# Patient Record
Sex: MALE | Race: WHITE | NOT HISPANIC OR LATINO | Employment: STUDENT | URBAN - METROPOLITAN AREA
[De-identification: names, ages, dates, MRNs, and addresses within clinical notes are randomized per-mention and may not be internally consistent; named-entity substitution may affect disease eponyms.]

---

## 2017-07-26 ENCOUNTER — OFFICE VISIT (OUTPATIENT)
Dept: URGENT CARE | Facility: CLINIC | Age: 27
End: 2017-07-26
Payer: COMMERCIAL

## 2017-07-26 VITALS
SYSTOLIC BLOOD PRESSURE: 151 MMHG | BODY MASS INDEX: 31.08 KG/M2 | TEMPERATURE: 98 F | RESPIRATION RATE: 18 BRPM | HEIGHT: 75 IN | WEIGHT: 250 LBS | DIASTOLIC BLOOD PRESSURE: 94 MMHG | OXYGEN SATURATION: 100 % | HEART RATE: 96 BPM

## 2017-07-26 DIAGNOSIS — R19.7 DIARRHEA IN ADULT PATIENT: Primary | ICD-10-CM

## 2017-07-26 DIAGNOSIS — R11.0 NAUSEA: ICD-10-CM

## 2017-07-26 PROCEDURE — 99203 OFFICE O/P NEW LOW 30 MIN: CPT | Mod: S$GLB,,, | Performed by: EMERGENCY MEDICINE

## 2017-07-26 RX ORDER — BUPROPION HYDROCHLORIDE 300 MG/1
300 TABLET ORAL DAILY
COMMUNITY

## 2017-07-26 RX ORDER — ONDANSETRON 4 MG/1
4 TABLET, ORALLY DISINTEGRATING ORAL EVERY 6 HOURS PRN
Qty: 12 TABLET | Refills: 2 | Status: SHIPPED | OUTPATIENT
Start: 2017-07-26

## 2017-07-26 RX ORDER — ALPRAZOLAM 0.5 MG/1
0.5 TABLET ORAL 3 TIMES DAILY
COMMUNITY

## 2017-07-26 RX ORDER — METRONIDAZOLE 500 MG/1
500 TABLET ORAL 3 TIMES DAILY
Qty: 21 TABLET | Refills: 0 | Status: SHIPPED | OUTPATIENT
Start: 2017-07-26 | End: 2017-08-02

## 2017-07-27 NOTE — PATIENT INSTRUCTIONS
Please return here or go to the Emergency Department for any concerns or worsening of condition.  If you were prescribed antibiotics, please take them to completion.  If you were prescribed a narcotic medication, do not drive or operate heavy equipment or machinery while taking these medications.  Please follow up with your primary care doctor or specialist as needed.  If you  smoke, please stop smoking.      Treating Diarrhea    Diarrhea happens when you have loose, watery, or frequent bowel movements. It is a common problem with many causes. Most cases of diarrhea clear up on their own. But certain cases may need treatment. Be sure to see your healthcare provider if your symptoms do not improve within a few days.  Getting relief  Treatment of diarrhea depends on its cause. Diarrhea caused by bacterial or parasite infection is often treated with antibiotics. Diarrhea caused by other factors, such as a stomach virus, often improves with simple home treatment. The tips below may also help relieve your symptoms.  · Drink plenty of fluids. This helps prevent too much fluid loss (dehydration). Water, clear soups, and electrolyte solutions are good choices. Avoid alcohol, coffee, tea, and milk. These can irritate your intestines and make symptoms worse.  · Suck on ice chips if drinking makes you queasy.  · Return to your normal diet slowly. You may want to eat bland foods at first, such as rice and toast. Also, you may need to avoid certain foods for a while, such as dairy products. These can make symptoms worse. Ask your healthcare provider if there are any other foods you should avoid.  · If you were prescribed antibiotics, take them as directed.  · Do not take anti-diarrhea medicines without asking your healthcare provider first.  Call your healthcare provider   Call your healthcare provider if you have any of the following:   · A fever of 100.4°F (38.0°C) or higher, or as directed by your healthcare  provider  · Severe pain  · Worsening diarrhea or diarrhea for more than 2 days  · Bloody vomit or stool  · Signs of dehydration (dizziness, dry mouth and tongue, rapid pulse, dark urine)  Date Last Reviewed: 7/1/2016 © 2000-2016 [employer]. [address] [place] [address]. All rights reserved. This information is not intended as a substitute for professional medical care. Always follow your healthcare professional's instructions.

## 2017-07-27 NOTE — PROGRESS NOTES
Subjective:       Patient ID: Dipak Boyd is a 26 y.o. male.    Chief Complaint: Diarrhea (2 weeks )    Diarrhea    This is a new problem. The current episode started 1 to 4 weeks ago. The problem occurs 2 to 4 times per day. The stool consistency is described as watery. The patient states that diarrhea does not awaken (last week one time ) him from sleep. Pertinent negatives include no abdominal pain, chills, fever or vomiting. Nothing aggravates the symptoms. He has tried nothing for the symptoms. His past medical history is significant for bowel resection. There is no history of inflammatory bowel disease, irritable bowel syndrome or short gut syndrome.     Review of Systems   Constitution: Negative for chills and fever.   Cardiovascular: Negative for chest pain.   Respiratory: Negative for shortness of breath.    Musculoskeletal: Negative for back pain.   Gastrointestinal: Positive for diarrhea (4 times a day for 2 weeks ) and nausea (2 weeks ). Negative for abdominal pain, constipation, hematochezia, melena and vomiting.   Genitourinary: Negative for dysuria.       Objective:      Physical Exam   Constitutional: He is oriented to person, place, and time. He appears well-developed and well-nourished.   HENT:   Head: Normocephalic and atraumatic.   Right Ear: External ear normal.   Left Ear: External ear normal.   Nose: Nose normal.   Mouth/Throat: Mucous membranes are normal.   Eyes: Conjunctivae and lids are normal.   Neck: Trachea normal and full passive range of motion without pain. Neck supple.   Cardiovascular: Normal rate, regular rhythm and normal heart sounds.    Pulmonary/Chest: Effort normal and breath sounds normal. No respiratory distress.   Abdominal: Soft. Normal appearance and bowel sounds are normal. He exhibits no distension, no abdominal bruit, no pulsatile midline mass and no mass. There is no tenderness.   Musculoskeletal: Normal range of motion. He exhibits no edema.   Neurological: He  is alert and oriented to person, place, and time. He has normal strength.   Skin: Skin is warm, dry and intact. He is not diaphoretic. No pallor.   Psychiatric: He has a normal mood and affect. His speech is normal and behavior is normal. Judgment and thought content normal. Cognition and memory are normal.   Nursing note and vitals reviewed.      Assessment:       1. Diarrhea in adult patient        Plan:       Dipak was seen today for diarrhea.    Diagnoses and all orders for this visit:    Diarrhea in adult patient  -     metronidazole (FLAGYL) 500 MG tablet; Take 1 tablet (500 mg total) by mouth 3 (three) times daily.  -     Ambulatory referral to Gastroenterology

## 2018-01-21 ENCOUNTER — OFFICE VISIT (OUTPATIENT)
Dept: URGENT CARE | Facility: CLINIC | Age: 28
End: 2018-01-21
Payer: COMMERCIAL

## 2018-01-21 VITALS
BODY MASS INDEX: 31.08 KG/M2 | OXYGEN SATURATION: 98 % | DIASTOLIC BLOOD PRESSURE: 89 MMHG | SYSTOLIC BLOOD PRESSURE: 135 MMHG | HEIGHT: 75 IN | TEMPERATURE: 100 F | WEIGHT: 250 LBS | RESPIRATION RATE: 18 BRPM | HEART RATE: 103 BPM

## 2018-01-21 DIAGNOSIS — J10.1 INFLUENZA B: ICD-10-CM

## 2018-01-21 DIAGNOSIS — R50.9 FEVER, UNSPECIFIED FEVER CAUSE: Primary | ICD-10-CM

## 2018-01-21 LAB
CTP QC/QA: YES
FLUAV AG NPH QL: NEGATIVE
FLUBV AG NPH QL: POSITIVE

## 2018-01-21 PROCEDURE — 99214 OFFICE O/P EST MOD 30 MIN: CPT | Mod: S$GLB,,, | Performed by: EMERGENCY MEDICINE

## 2018-01-21 PROCEDURE — 87804 INFLUENZA ASSAY W/OPTIC: CPT | Mod: 59,QW,S$GLB, | Performed by: EMERGENCY MEDICINE

## 2018-01-21 RX ORDER — CODEINE PHOSPHATE AND GUAIFENESIN 10; 100 MG/5ML; MG/5ML
10 SOLUTION ORAL EVERY 8 HOURS PRN
Qty: 150 ML | Refills: 0 | Status: SHIPPED | OUTPATIENT
Start: 2018-01-21 | End: 2018-01-26

## 2018-01-21 RX ORDER — OSELTAMIVIR PHOSPHATE 75 MG/1
75 CAPSULE ORAL 2 TIMES DAILY
Qty: 10 CAPSULE | Refills: 0 | Status: SHIPPED | OUTPATIENT
Start: 2018-01-21 | End: 2018-01-26

## 2018-01-21 NOTE — PATIENT INSTRUCTIONS
Influenza (Adult)    Influenza is also called the flu. It is a viral illness that affects the air passages of your lungs. It is different from the common cold. The flu can easily be passed from one to person to another. It may be spread through the air by coughing and sneezing. Or it can be spread by touching the sick person and then touching your own eyes, nose, or mouth.  The flu starts 1 to 3 days after you are exposed to the flu virus. It may last for 1 to 2 weeks but many people feel tired or fatigued for many weeks afterward. You usually dont need to take antibiotics unless you have a complication. This might be an ear or sinus infection or pneumonia.  Symptoms of the flu may be mild or severe. They can include extreme tiredness (wanting to stay in bed all day), chills, fevers, muscle aches, soreness with eye movement, headache, and a dry, hacking cough.  Home care  Follow these guidelines when caring for yourself at home:  · Avoid being around cigarette smoke, whether yours or other peoples.  · Acetaminophen or ibuprofen will help ease your fever, muscle aches, and headache. Dont give aspirin to anyone younger than 18 who has the flu. Aspirin can harm the liver.  · Nausea and loss of appetite are common with the flu. Eat light meals. Drink 6 to 8 glasses of liquids every day. Good choices are water, sport drinks, soft drinks without caffeine, juices, tea, and soup. Extra fluids will also help loosen secretions in your nose and lungs.  · Over-the-counter cold medicines will not make the flu go away faster. But the medicines may help with coughing, sore throat, and congestion in your nose and sinuses. Dont use a decongestant if you have high blood pressure.  · Stay home until your fever has been gone for at least 24 hours without using medicine to reduce fever.  Follow-up care  Follow up with your healthcare provider, or as advised, if you are not getting better over the next week.  If you are age 65 or  older, talk with your provider about getting a pneumococcal vaccine every 5 years. You should also get this vaccine if you have chronic asthma or COPD. All adults should get a flu vaccine every fall. Ask your provider about this.  When to seek medical advice  Call your healthcare provider right away if any of these occur:  · Cough with lots of colored mucus (sputum) or blood in your mucus  · Chest pain, shortness of breath, wheezing, or trouble breathing  · Severe headache, or face, neck, or ear pain  · New rash with fever  · Fever of 100.4°F (38°C) or higher, or as directed by your healthcare provider  · Confusion, behavior change, or seizure  · Severe weakness or dizziness  · You get a new fever or cough after getting better for a few days  Date Last Reviewed: 1/1/2017  © 6488-1100 [employer]. [address] [place] [address]. All rights reserved. This information is not intended as a substitute for professional medical care. Always follow your healthcare professional's instructions.        FLU B POSITIVE  REST AND HYDRATE WITH PLENTY OF FLUIDS  TYLENOL 650 MG EVERY 4-6 HOURS FOR FEVER/ACHES  MOTRIN 600 MG EVERY 6-8 HOURS FOR FEVER/ACHES  TAMIFLU RX  CHERATUSSIN AC  OVER THE COUNTER ZYRTEC D OR CLARITIN D OR ALLEGRA D FOR CONGESTION AND RUNNY NOSE  OVER THE COUNTER FLONASE NASAL SPRAY FOR SINUS CONGESTION  OVER THE COUNTER MUCINEX DM TWICE DAILY FOR COUGH    RETURN FOR ANY CONCERNS OR PROBLEMS  SEE FLU SHEET

## 2018-01-21 NOTE — PROGRESS NOTES
Subjective:       Patient ID: Dipak Boyd is a 27 y.o. male.    Vitals:    01/21/18 1636   BP: 135/89   Pulse: 103   Resp: 18   Temp: 99.8 °F (37.7 °C)       Chief Complaint: Fever (99.1 today ) and Generalized Body Aches    SEVERE COUGH KEPT HIM UP ALL NIGHT LAST NIGHT AND NOT RELIEVED WITH MUCINEX. JUST GOT HOME FROM [place]      Fever    This is a new problem. The current episode started in the past 7 days (3 days ). The problem occurs constantly. The problem has been gradually worsening. Maximum temperature: today 99.1  Associated symptoms include coughing, headaches and muscle aches. Pertinent negatives include no abdominal pain, chest pain, congestion, ear pain, nausea, sore throat or wheezing. He has tried nothing for the symptoms.   Risk factors comment:  Unknown    Review of Systems   Constitution: Positive for chills, fever, weakness and malaise/fatigue.   HENT: Negative for congestion, ear pain, hoarse voice and sore throat.    Eyes: Negative for discharge and redness.   Cardiovascular: Negative for chest pain, dyspnea on exertion and leg swelling.   Respiratory: Positive for cough. Negative for shortness of breath, sputum production and wheezing.    Musculoskeletal: Negative for myalgias.   Gastrointestinal: Negative for abdominal pain and nausea.   Neurological: Positive for headaches.       Objective:      Physical Exam   Constitutional: He is oriented to person, place, and time. He appears well-developed and well-nourished. He is cooperative.  Non-toxic appearance. He does not appear ill. No distress.   HENT:   Head: Normocephalic and atraumatic.   Right Ear: Hearing, tympanic membrane, external ear and ear canal normal.   Left Ear: Hearing, tympanic membrane, external ear and ear canal normal.   Nose: No mucosal edema, rhinorrhea or nasal deformity. No epistaxis. Right sinus exhibits no maxillary sinus tenderness and no frontal sinus tenderness. Left sinus exhibits no maxillary sinus  tenderness and no frontal sinus tenderness.   Mouth/Throat: Uvula is midline, oropharynx is clear and moist and mucous membranes are normal. No trismus in the jaw. Normal dentition. No uvula swelling. No posterior oropharyngeal erythema.   NASAL CONGESTION   Eyes: Conjunctivae and lids are normal. No scleral icterus.   Sclera clear bilat   Neck: Trachea normal, full passive range of motion without pain and phonation normal. Neck supple.   Cardiovascular: Normal rate, regular rhythm, normal heart sounds, intact distal pulses and normal pulses.    Pulmonary/Chest: Effort normal and breath sounds normal. No respiratory distress.   INTERMITTENT COUGHING   Abdominal: Soft. Normal appearance and bowel sounds are normal. There is no tenderness.   Musculoskeletal: Normal range of motion. He exhibits no edema or deformity.   Neurological: He is alert and oriented to person, place, and time. He exhibits normal muscle tone.   Skin: Skin is warm, dry and intact. He is not diaphoretic. No pallor.   Psychiatric: He has a normal mood and affect. His speech is normal and behavior is normal. Cognition and memory are normal.   Nursing note and vitals reviewed.        Office Visit on 01/21/2018   Component Date Value Ref Range Status    Rapid Influenza A Ag 01/21/2018 Negative  Negative Final    Rapid Influenza B Ag 01/21/2018 Positive* Negative Final     Acceptable 01/21/2018 Yes   Final       Assessment:       1. Fever, unspecified fever cause    2. Influenza B        Plan:       Dipak was seen today for fever and generalized body aches.    Diagnoses and all orders for this visit:    Fever, unspecified fever cause  -     POCT Influenza A/B    Influenza B    Other orders  -     oseltamivir (TAMIFLU) 75 MG capsule; Take 1 capsule (75 mg total) by mouth 2 (two) times daily.  -     guaifenesin-codeine 100-10 mg/5 ml (TUSSI-ORGANIDIN NR)  mg/5 mL syrup; Take 10 mLs by mouth every 8 (eight) hours as needed for  Cough (FOR SEVERE COUG WHEN NOT DRIVING OR AT NIGHT).      Patient Instructions     Influenza (Adult)    Influenza is also called the flu. It is a viral illness that affects the air passages of your lungs. It is different from the common cold. The flu can easily be passed from one to person to another. It may be spread through the air by coughing and sneezing. Or it can be spread by touching the sick person and then touching your own eyes, nose, or mouth.  The flu starts 1 to 3 days after you are exposed to the flu virus. It may last for 1 to 2 weeks but many people feel tired or fatigued for many weeks afterward. You usually dont need to take antibiotics unless you have a complication. This might be an ear or sinus infection or pneumonia.  Symptoms of the flu may be mild or severe. They can include extreme tiredness (wanting to stay in bed all day), chills, fevers, muscle aches, soreness with eye movement, headache, and a dry, hacking cough.  Home care  Follow these guidelines when caring for yourself at home:  · Avoid being around cigarette smoke, whether yours or other peoples.  · Acetaminophen or ibuprofen will help ease your fever, muscle aches, and headache. Dont give aspirin to anyone younger than 18 who has the flu. Aspirin can harm the liver.  · Nausea and loss of appetite are common with the flu. Eat light meals. Drink 6 to 8 glasses of liquids every day. Good choices are water, sport drinks, soft drinks without caffeine, juices, tea, and soup. Extra fluids will also help loosen secretions in your nose and lungs.  · Over-the-counter cold medicines will not make the flu go away faster. But the medicines may help with coughing, sore throat, and congestion in your nose and sinuses. Dont use a decongestant if you have high blood pressure.  · Stay home until your fever has been gone for at least 24 hours without using medicine to reduce fever.  Follow-up care  Follow up with your healthcare provider, or as  advised, if you are not getting better over the next week.  If you are age 65 or older, talk with your provider about getting a pneumococcal vaccine every 5 years. You should also get this vaccine if you have chronic asthma or COPD. All adults should get a flu vaccine every fall. Ask your provider about this.  When to seek medical advice  Call your healthcare provider right away if any of these occur:  · Cough with lots of colored mucus (sputum) or blood in your mucus  · Chest pain, shortness of breath, wheezing, or trouble breathing  · Severe headache, or face, neck, or ear pain  · New rash with fever  · Fever of 100.4°F (38°C) or higher, or as directed by your healthcare provider  · Confusion, behavior change, or seizure  · Severe weakness or dizziness  · You get a new fever or cough after getting better for a few days  Date Last Reviewed: 1/1/2017  © 7860-3578 [employer]. [address] [place] [address]. All rights reserved. This information is not intended as a substitute for professional medical care. Always follow your healthcare professional's instructions.        FLU B POSITIVE  REST AND HYDRATE WITH PLENTY OF FLUIDS  TYLENOL 650 MG EVERY 4-6 HOURS FOR FEVER/ACHES  MOTRIN 600 MG EVERY 6-8 HOURS FOR FEVER/ACHES  TAMIFLU RX  CHERATUSSIN AC  OVER THE COUNTER ZYRTEC D OR CLARITIN D OR ALLEGRA D FOR CONGESTION AND RUNNY NOSE  OVER THE COUNTER FLONASE NASAL SPRAY FOR SINUS CONGESTION  OVER THE COUNTER MUCINEX DM TWICE DAILY FOR COUGH    RETURN FOR ANY CONCERNS OR PROBLEMS  SEE FLU SHEET

## 2024-04-26 DIAGNOSIS — R79.89 ELEVATED FERRITIN: ICD-10-CM

## 2024-04-26 DIAGNOSIS — D64.9 ANEMIA: ICD-10-CM

## 2024-04-26 DIAGNOSIS — Z85.6 HISTORY OF ACUTE LYMPHOBLASTIC LEUKEMIA (ALL): Primary | ICD-10-CM

## 2024-04-29 NOTE — PROGRESS NOTES
Subjective:       Patient ID: Dipak Boyd is a 33 y.o. male.    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  Workup revealed an acute biphenotypic leukemia with complex cytogenetics.  He underwent induction treatment at [facility] with rituximab, Fludarabine and Sirena-C + Decadron.  He received a bone marrow transplant from a matched unrelated donor 9/14/10 following a conditioning regimen of Fludarabine and Busulfan.  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  Bone marrow exam 1/23/12 showed a 40-50% cellular marrow with trilineage maturation and no evidence of recurrent leukemia.  He was last seen for follow-up in my office 3/27/14 and did not return for additional appointments.    Wellness testing by his PCP 2/24 showed elevated LFTs.  Additional workup showed an elevated serum ferritin level >1000 ng/mL.  Serum iron was 90, TIBC 232 and saturation 39%.  His LFTs improved on follow-up testing.  Testing for hepatitis-B and C was negative.  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  He has also been noted to have mild anemia and mild thrombocytopenia.    Interval History  He presents to the office today by himself for a hematology evaluation.  He feels well.  He denies any recent illnesses or infections.  He has an excellent energy level and performance status.  He denies any abnormal bruising or bleeding.  No fever, chills, night sweats or weight loss.    Review of  "Systems   Constitutional:  Negative for appetite change, fatigue and fever.   HENT:  Negative for mouth sores, nosebleeds, sore throat and trouble swallowing.    Eyes: Negative.    Respiratory:  Negative for cough and shortness of breath.    Cardiovascular:  Negative for chest pain, palpitations and leg swelling.   Gastrointestinal:  Negative for abdominal distention, abdominal pain, constipation, diarrhea, nausea and vomiting.   Genitourinary:  Negative for dysuria, frequency and urgency.   Musculoskeletal:  Negative for arthralgias and back pain.   Integumentary:  Negative for pallor and rash.   Neurological:  Negative for dizziness, weakness, numbness and headaches.   Hematological:  Negative for adenopathy. Does not bruise/bleed easily.   Psychiatric/Behavioral: Negative.         PMHx:  SVT s/p ablation 2004, chronic anxiety, AML, shingles  PSHx:  Varicocele repair, strabismus surgery, bone marrow  SH:  Lifetime nonsmoker, occasional social alcohol.  Lives in [place] with his wife and 2 children, works for [employer].  FH:  PGF had leukemia and a maternal uncle had colon cancer.    Objective:        /74   Pulse 102   Temp 98.2 °F (36.8 °C)   Resp 14   Ht 6' 3.5" (1.918 m)   Wt 104.3 kg (230 lb)   SpO2 99%   BMI 28.37 kg/m²    Physical Exam  Constitutional:       Comments: Well-developed, healthy-appearing white male in NAD   HENT:      Head: Normocephalic.      Mouth/Throat:      Mouth: Mucous membranes are moist.      Pharynx: Oropharynx is clear. No posterior oropharyngeal erythema.   Eyes:      General: No scleral icterus.     Extraocular Movements: Extraocular movements intact.      Pupils: Pupils are equal, round, and reactive to light.   Cardiovascular:      Rate and Rhythm: Normal rate and regular rhythm.      Heart sounds: No murmur heard.  Pulmonary:      Comments: Lungs clear to auscultation  Abdominal:      General: Bowel sounds are normal. There is no distension.      " Palpations: Abdomen is soft.      Tenderness: There is no abdominal tenderness.      Comments: No palpable masses or organomegaly   Musculoskeletal:         General: No swelling or tenderness. Normal range of motion.      Cervical back: Neck supple. No tenderness.   Lymphadenopathy:      Comments: No palpable peripheral lymphadenopathy   Skin:     General: Skin is warm and dry.      Findings: No rash.   Neurological:      General: No focal deficit present.      Mental Status: He is alert and oriented to person, place, and time.      Cranial Nerves: No cranial nerve deficit.      Motor: No weakness.       ECOG SCORE    0 - Fully active-able to carry on all pre-disease performance without restriction          LABORATORY  Recent Results (from the past 168 hour(s))   Reticulocytes    Collection Time: 05/06/24  1:36 PM   Result Value Ref Range    Retic Cnt Auto 1.56 1.1 - 2.1 %    RET# 0.0660 0.026 - 0.095 x10e6/uL   Iron and TIBC    Collection Time: 05/06/24  1:36 PM   Result Value Ref Range    Iron Binding Capacity Unsaturated 104 69 - 240 ug/dL    Iron Level 112 65 - 175 ug/dL    Transferrin 197 174 - 364 mg/dL    Iron Binding Capacity Total 216 (L) 250 - 450 ug/dL    Iron Saturation 52 (H) 20 - 50 %   CBC with Differential    Collection Time: 05/06/24  1:36 PM   Result Value Ref Range    WBC 6.89 4.50 - 11.50 x10(3)/mcL    RBC 4.18 (L) 4.70 - 6.10 x10(6)/mcL    Hgb 13.6 (L) 14.0 - 18.0 g/dL    Hct 40.9 (L) 42.0 - 52.0 %    MCV 97.8 (H) 80.0 - 94.0 fL    MCH 32.5 (H) 27.0 - 31.0 pg    MCHC 33.3 33.0 - 36.0 g/dL    RDW 13.0 11.5 - 17.0 %    Platelet 116 (L) 130 - 400 x10(3)/mcL    MPV 9.3 7.4 - 10.4 fL    Neut % 66.4 %    Lymph % 24.8 %    Mono % 7.8 %    Eos % 0.6 %    Basophil % 0.3 %    Lymph # 1.71 0.6 - 4.6 x10(3)/mcL    Neut # 4.57 2.1 - 9.2 x10(3)/mcL    Mono # 0.54 0.1 - 1.3 x10(3)/mcL    Eos # 0.04 0 - 0.9 x10(3)/mcL    Baso # 0.02 <=0.2 x10(3)/mcL    IG# 0.01 0 - 0.04 x10(3)/mcL    IG% 0.1 %             Assessment:   Elevated serum ferritin level  Heterozygous H63D HFE mutation  Mild thrombocytopenia  History of acute biphenotypic leukemia s/p MUD allo SCT 9/10      Plan:   Repeat ferritin level drawn today is pending.  Iron saturation is at the upper limit of normal.  Heterozygous H63D mutation is typically not associated with clinically significant hemochromatosis.  Genotype testing could also be affected by his previous bone marrow transplantation.  I would recommend an initial period of close observation with monitoring of his serum ferritin level.    No indication at this time for therapeutic phlebotomy.  Mild thrombocytopenia could also be related to his previous transplant and close monitoring is indicated.  I will have him return in 3 months with a follow-up CBC, iron profile and serum ferritin level.  Treatment plan was discussed and reviewed with the patient.  All questions were answered.      REBECA TO MD    Other Physicians  Dr. Corey Littlejohn

## 2024-05-03 ENCOUNTER — TELEPHONE (OUTPATIENT)
Dept: HEMATOLOGY/ONCOLOGY | Facility: CLINIC | Age: 34
End: 2024-05-03
Payer: COMMERCIAL

## 2024-05-03 NOTE — TELEPHONE ENCOUNTER
Received call back from patient. Patient denied concerns or questions at this time. Confirmed appointment date, time and location with patient.

## 2024-05-03 NOTE — TELEPHONE ENCOUNTER
Attempted to call patient regarding new patient appointment on 5/6/24 with Dr. Lang, no answer, VM left.

## 2024-05-06 ENCOUNTER — OFFICE VISIT (OUTPATIENT)
Dept: HEMATOLOGY/ONCOLOGY | Facility: CLINIC | Age: 34
End: 2024-05-06
Payer: COMMERCIAL

## 2024-05-06 VITALS
WEIGHT: 230 LBS | BODY MASS INDEX: 28.01 KG/M2 | HEART RATE: 102 BPM | RESPIRATION RATE: 14 BRPM | HEIGHT: 76 IN | OXYGEN SATURATION: 99 % | SYSTOLIC BLOOD PRESSURE: 120 MMHG | TEMPERATURE: 98 F | DIASTOLIC BLOOD PRESSURE: 74 MMHG

## 2024-05-06 DIAGNOSIS — Z85.6 HISTORY OF ACUTE LYMPHOBLASTIC LEUKEMIA (ALL): ICD-10-CM

## 2024-05-06 DIAGNOSIS — R79.0 ABNORMAL RESULT OF IRON PROFILE TESTING: Primary | ICD-10-CM

## 2024-05-06 LAB
BASOPHILS # BLD AUTO: 0.02 X10(3)/MCL
BASOPHILS NFR BLD AUTO: 0.3 %
EOSINOPHIL # BLD AUTO: 0.04 X10(3)/MCL (ref 0–0.9)
EOSINOPHIL NFR BLD AUTO: 0.6 %
ERYTHROCYTE [DISTWIDTH] IN BLOOD BY AUTOMATED COUNT: 13 % (ref 11.5–17)
FERRITIN SERPL-MCNC: 912.34 NG/ML (ref 21.81–274.66)
HCT VFR BLD AUTO: 40.9 % (ref 42–52)
HGB BLD-MCNC: 13.6 G/DL (ref 14–18)
IMM GRANULOCYTES # BLD AUTO: 0.01 X10(3)/MCL (ref 0–0.04)
IMM GRANULOCYTES NFR BLD AUTO: 0.1 %
IRON SATN MFR SERPL: 52 % (ref 20–50)
IRON SERPL-MCNC: 112 UG/DL (ref 65–175)
LYMPHOCYTES # BLD AUTO: 1.71 X10(3)/MCL (ref 0.6–4.6)
LYMPHOCYTES NFR BLD AUTO: 24.8 %
MCH RBC QN AUTO: 32.5 PG (ref 27–31)
MCHC RBC AUTO-ENTMCNC: 33.3 G/DL (ref 33–36)
MCV RBC AUTO: 97.8 FL (ref 80–94)
MONOCYTES # BLD AUTO: 0.54 X10(3)/MCL (ref 0.1–1.3)
MONOCYTES NFR BLD AUTO: 7.8 %
NEUTROPHILS # BLD AUTO: 4.57 X10(3)/MCL (ref 2.1–9.2)
NEUTROPHILS NFR BLD AUTO: 66.4 %
PLATELET # BLD AUTO: 116 X10(3)/MCL (ref 130–400)
PMV BLD AUTO: 9.3 FL (ref 7.4–10.4)
RBC # BLD AUTO: 4.18 X10(6)/MCL (ref 4.7–6.1)
RET# (OHS): 0.07 X10E6/UL (ref 0.03–0.1)
RETICULOCYTE COUNT AUTOMATED (OLG): 1.56 % (ref 1.1–2.1)
TIBC SERPL-MCNC: 104 UG/DL (ref 69–240)
TIBC SERPL-MCNC: 216 UG/DL (ref 250–450)
TRANSFERRIN SERPL-MCNC: 197 MG/DL (ref 174–364)
WBC # SPEC AUTO: 6.89 X10(3)/MCL (ref 4.5–11.5)

## 2024-05-06 PROCEDURE — 99214 OFFICE O/P EST MOD 30 MIN: CPT | Mod: S$GLB,,, | Performed by: INTERNAL MEDICINE

## 2024-05-06 PROCEDURE — 1160F RVW MEDS BY RX/DR IN RCRD: CPT | Mod: CPTII,S$GLB,, | Performed by: INTERNAL MEDICINE

## 2024-05-06 PROCEDURE — 85025 COMPLETE CBC W/AUTO DIFF WBC: CPT | Performed by: INTERNAL MEDICINE

## 2024-05-06 PROCEDURE — 36415 COLL VENOUS BLD VENIPUNCTURE: CPT | Performed by: INTERNAL MEDICINE

## 2024-05-06 PROCEDURE — 99999 PR PBB SHADOW E&M-EST. PATIENT-LVL IV: CPT | Mod: PBBFAC,,, | Performed by: INTERNAL MEDICINE

## 2024-05-06 PROCEDURE — 3074F SYST BP LT 130 MM HG: CPT | Mod: CPTII,S$GLB,, | Performed by: INTERNAL MEDICINE

## 2024-05-06 PROCEDURE — 83540 ASSAY OF IRON: CPT | Performed by: INTERNAL MEDICINE

## 2024-05-06 PROCEDURE — 3008F BODY MASS INDEX DOCD: CPT | Mod: CPTII,S$GLB,, | Performed by: INTERNAL MEDICINE

## 2024-05-06 PROCEDURE — 1159F MED LIST DOCD IN RCRD: CPT | Mod: CPTII,S$GLB,, | Performed by: INTERNAL MEDICINE

## 2024-05-06 PROCEDURE — 85045 AUTOMATED RETICULOCYTE COUNT: CPT | Performed by: INTERNAL MEDICINE

## 2024-05-06 PROCEDURE — 3078F DIAST BP <80 MM HG: CPT | Mod: CPTII,S$GLB,, | Performed by: INTERNAL MEDICINE

## 2024-05-06 PROCEDURE — 82728 ASSAY OF FERRITIN: CPT | Performed by: INTERNAL MEDICINE

## 2024-05-06 NOTE — LETTER
May 6, 2024        Corey Littlejohn MD  [address]  [address]  [facility] [address]             [facility]  [address], [address]  [facility] [address]  Phone: [phone]   Patient: Dipak Boyd   MR Number: [id number]   YOB: 1990   Date of Visit: 5/6/2024       Dear Dr. Littlejohn:    Thank you for referring Dipak Boyd to me for evaluation. Attached you will find relevant portions of my assessment and plan of care.    If you have questions, please do not hesitate to call me. I look forward to following Dipak Boyd along with you.    Sincerely,      Eliseo Lang MD            CC  No Recipients    Enclosure

## 2024-07-30 ENCOUNTER — LAB VISIT (OUTPATIENT)
Dept: LAB | Facility: HOSPITAL | Age: 34
End: 2024-07-30
Attending: INTERNAL MEDICINE
Payer: COMMERCIAL

## 2024-07-30 DIAGNOSIS — R79.0 ABNORMAL RESULT OF IRON PROFILE TESTING: ICD-10-CM

## 2024-07-30 DIAGNOSIS — Z85.6 HISTORY OF ACUTE LYMPHOBLASTIC LEUKEMIA (ALL): ICD-10-CM

## 2024-07-30 LAB
BASOPHILS # BLD AUTO: 0.01 X10(3)/MCL
BASOPHILS NFR BLD AUTO: 0.2 %
EOSINOPHIL # BLD AUTO: 0.05 X10(3)/MCL (ref 0–0.9)
EOSINOPHIL NFR BLD AUTO: 0.8 %
ERYTHROCYTE [DISTWIDTH] IN BLOOD BY AUTOMATED COUNT: 12 % (ref 11.5–17)
HCT VFR BLD AUTO: 42.6 % (ref 42–52)
HGB BLD-MCNC: 14.3 G/DL (ref 14–18)
IMM GRANULOCYTES # BLD AUTO: 0.01 X10(3)/MCL (ref 0–0.04)
IMM GRANULOCYTES NFR BLD AUTO: 0.2 %
LYMPHOCYTES # BLD AUTO: 2.01 X10(3)/MCL (ref 0.6–4.6)
LYMPHOCYTES NFR BLD AUTO: 33.2 %
MCH RBC QN AUTO: 32.8 PG (ref 27–31)
MCHC RBC AUTO-ENTMCNC: 33.6 G/DL (ref 33–36)
MCV RBC AUTO: 97.7 FL (ref 80–94)
MONOCYTES # BLD AUTO: 0.45 X10(3)/MCL (ref 0.1–1.3)
MONOCYTES NFR BLD AUTO: 7.4 %
NEUTROPHILS # BLD AUTO: 3.52 X10(3)/MCL (ref 2.1–9.2)
NEUTROPHILS NFR BLD AUTO: 58.2 %
PLATELET # BLD AUTO: 115 X10(3)/MCL (ref 130–400)
PMV BLD AUTO: 9.2 FL (ref 7.4–10.4)
RBC # BLD AUTO: 4.36 X10(6)/MCL (ref 4.7–6.1)
WBC # BLD AUTO: 6.05 X10(3)/MCL (ref 4.5–11.5)

## 2024-07-30 PROCEDURE — 36415 COLL VENOUS BLD VENIPUNCTURE: CPT

## 2024-07-30 PROCEDURE — 85025 COMPLETE CBC W/AUTO DIFF WBC: CPT

## 2024-07-31 DIAGNOSIS — R79.0 ABNORMAL RESULT OF IRON PROFILE TESTING: Primary | ICD-10-CM

## 2024-07-31 LAB
FERRITIN SERPL-MCNC: 916.84 NG/ML (ref 21.81–274.66)
IRON SATN MFR SERPL: 40 % (ref 20–50)
IRON SERPL-MCNC: 85 UG/DL (ref 65–175)
TIBC SERPL-MCNC: 130 UG/DL (ref 69–240)
TIBC SERPL-MCNC: 215 UG/DL (ref 250–450)
TRANSFERRIN SERPL-MCNC: 195 MG/DL (ref 174–364)

## 2024-07-31 PROCEDURE — 83550 IRON BINDING TEST: CPT | Performed by: INTERNAL MEDICINE

## 2024-07-31 PROCEDURE — 82728 ASSAY OF FERRITIN: CPT | Performed by: INTERNAL MEDICINE

## 2024-07-31 PROCEDURE — 83540 ASSAY OF IRON: CPT | Performed by: INTERNAL MEDICINE

## 2024-08-06 ENCOUNTER — OFFICE VISIT (OUTPATIENT)
Dept: HEMATOLOGY/ONCOLOGY | Facility: CLINIC | Age: 34
End: 2024-08-06
Payer: COMMERCIAL

## 2024-08-06 VITALS
SYSTOLIC BLOOD PRESSURE: 122 MMHG | HEIGHT: 76 IN | TEMPERATURE: 98 F | BODY MASS INDEX: 27.07 KG/M2 | DIASTOLIC BLOOD PRESSURE: 82 MMHG | HEART RATE: 87 BPM | RESPIRATION RATE: 16 BRPM | WEIGHT: 222.31 LBS | OXYGEN SATURATION: 99 %

## 2024-08-06 DIAGNOSIS — Z85.6 HISTORY OF ACUTE LYMPHOBLASTIC LEUKEMIA (ALL): Primary | ICD-10-CM

## 2024-08-06 PROCEDURE — 99999 PR PBB SHADOW E&M-EST. PATIENT-LVL III: CPT | Mod: PBBFAC,,, | Performed by: INTERNAL MEDICINE

## 2025-02-12 ENCOUNTER — LAB VISIT (OUTPATIENT)
Dept: LAB | Facility: HOSPITAL | Age: 35
End: 2025-02-12
Attending: INTERNAL MEDICINE
Payer: COMMERCIAL

## 2025-02-12 DIAGNOSIS — D64.9 ANEMIA, UNSPECIFIED TYPE: ICD-10-CM

## 2025-02-12 DIAGNOSIS — Z85.6 HISTORY OF ACUTE LYMPHOBLASTIC LEUKEMIA (ALL): ICD-10-CM

## 2025-02-12 DIAGNOSIS — R79.89 HYPOURICEMIA: ICD-10-CM

## 2025-02-12 DIAGNOSIS — E29.1 3-OXO-5 ALPHA-STEROID DELTA 4-DEHYDROGENASE DEFICIENCY: Primary | ICD-10-CM

## 2025-02-12 DIAGNOSIS — E78.5 HYPERLIPIDEMIA, UNSPECIFIED HYPERLIPIDEMIA TYPE: ICD-10-CM

## 2025-02-12 DIAGNOSIS — E55.9 AVITAMINOSIS D: ICD-10-CM

## 2025-02-12 LAB
25(OH)D3+25(OH)D2 SERPL-MCNC: 44 NG/ML (ref 30–80)
ALBUMIN SERPL-MCNC: 4.3 G/DL (ref 3.5–5)
ALBUMIN/GLOB SERPL: 1.7 RATIO (ref 1.1–2)
ALP SERPL-CCNC: 53 UNIT/L (ref 40–150)
ALT SERPL-CCNC: 50 UNIT/L (ref 0–55)
ANION GAP SERPL CALC-SCNC: 6 MEQ/L
AST SERPL-CCNC: 44 UNIT/L (ref 5–34)
BASOPHILS # BLD AUTO: 0.02 X10(3)/MCL
BASOPHILS NFR BLD AUTO: 0.4 %
BILIRUB SERPL-MCNC: 0.5 MG/DL
BUN SERPL-MCNC: 17.1 MG/DL (ref 8.9–20.6)
CALCIUM SERPL-MCNC: 9.8 MG/DL (ref 8.4–10.2)
CHLORIDE SERPL-SCNC: 107 MMOL/L (ref 98–107)
CHOLEST SERPL-MCNC: 250 MG/DL
CHOLEST/HDLC SERPL: 4 {RATIO} (ref 0–5)
CO2 SERPL-SCNC: 25 MMOL/L (ref 22–29)
CREAT SERPL-MCNC: 0.92 MG/DL (ref 0.72–1.25)
CREAT/UREA NIT SERPL: 19
EOSINOPHIL # BLD AUTO: 0.04 X10(3)/MCL (ref 0–0.9)
EOSINOPHIL NFR BLD AUTO: 0.8 %
ERYTHROCYTE [DISTWIDTH] IN BLOOD BY AUTOMATED COUNT: 12.7 % (ref 11.5–17)
FERRITIN SERPL-MCNC: 853.9 NG/ML (ref 21.81–274.66)
GFR SERPLBLD CREATININE-BSD FMLA CKD-EPI: >60 ML/MIN/1.73/M2
GLOBULIN SER-MCNC: 2.6 GM/DL (ref 2.4–3.5)
GLUCOSE SERPL-MCNC: 94 MG/DL (ref 74–100)
HCT VFR BLD AUTO: 39.8 % (ref 42–52)
HDLC SERPL-MCNC: 67 MG/DL (ref 35–60)
HGB BLD-MCNC: 13.4 G/DL (ref 14–18)
IMM GRANULOCYTES # BLD AUTO: 0.01 X10(3)/MCL (ref 0–0.04)
IMM GRANULOCYTES NFR BLD AUTO: 0.2 %
IRON SATN MFR SERPL: 55 % (ref 20–50)
IRON SERPL-MCNC: 118 UG/DL (ref 65–175)
LDLC SERPL CALC-MCNC: 167 MG/DL (ref 50–140)
LYMPHOCYTES # BLD AUTO: 1.79 X10(3)/MCL (ref 0.6–4.6)
LYMPHOCYTES NFR BLD AUTO: 34.5 %
MCH RBC QN AUTO: 32.6 PG (ref 27–31)
MCHC RBC AUTO-ENTMCNC: 33.7 G/DL (ref 33–36)
MCV RBC AUTO: 96.8 FL (ref 80–94)
MONOCYTES # BLD AUTO: 0.33 X10(3)/MCL (ref 0.1–1.3)
MONOCYTES NFR BLD AUTO: 6.4 %
NEUTROPHILS # BLD AUTO: 3 X10(3)/MCL (ref 2.1–9.2)
NEUTROPHILS NFR BLD AUTO: 57.7 %
NRBC BLD AUTO-RTO: 0 %
PLATELET # BLD AUTO: 131 X10(3)/MCL (ref 130–400)
PLATELETS.RETICULATED NFR BLD AUTO: 2.6 % (ref 0.9–11.2)
PMV BLD AUTO: 9.6 FL (ref 7.4–10.4)
POTASSIUM SERPL-SCNC: 4.5 MMOL/L (ref 3.5–5.1)
PROT SERPL-MCNC: 6.9 GM/DL (ref 6.4–8.3)
RBC # BLD AUTO: 4.11 X10(6)/MCL (ref 4.7–6.1)
SODIUM SERPL-SCNC: 138 MMOL/L (ref 136–145)
TIBC SERPL-MCNC: 216 UG/DL (ref 250–450)
TIBC SERPL-MCNC: 98 UG/DL (ref 60–240)
TRANSFERRIN SERPL-MCNC: 181 MG/DL (ref 174–364)
TRIGL SERPL-MCNC: 82 MG/DL (ref 34–140)
TSH SERPL-ACNC: 1.49 UIU/ML (ref 0.35–4.94)
VLDLC SERPL CALC-MCNC: 16 MG/DL
WBC # BLD AUTO: 5.19 X10(3)/MCL (ref 4.5–11.5)

## 2025-02-12 PROCEDURE — 84410 TESTOSTERONE BIOAVAILABLE: CPT

## 2025-02-12 PROCEDURE — 83550 IRON BINDING TEST: CPT

## 2025-02-12 PROCEDURE — 84443 ASSAY THYROID STIM HORMONE: CPT

## 2025-02-12 PROCEDURE — 36415 COLL VENOUS BLD VENIPUNCTURE: CPT

## 2025-02-12 PROCEDURE — 82306 VITAMIN D 25 HYDROXY: CPT

## 2025-02-12 PROCEDURE — 30000890 MAYO GENERIC ORDERABLE

## 2025-02-12 PROCEDURE — 85025 COMPLETE CBC W/AUTO DIFF WBC: CPT

## 2025-02-12 PROCEDURE — 80061 LIPID PANEL: CPT

## 2025-02-12 PROCEDURE — 82728 ASSAY OF FERRITIN: CPT

## 2025-02-12 PROCEDURE — 80053 COMPREHEN METABOLIC PANEL: CPT

## 2025-02-18 ENCOUNTER — OFFICE VISIT (OUTPATIENT)
Dept: HEMATOLOGY/ONCOLOGY | Facility: CLINIC | Age: 35
End: 2025-02-18
Payer: COMMERCIAL

## 2025-02-18 VITALS
HEART RATE: 88 BPM | WEIGHT: 220 LBS | BODY MASS INDEX: 26.79 KG/M2 | HEIGHT: 76 IN | DIASTOLIC BLOOD PRESSURE: 84 MMHG | TEMPERATURE: 99 F | OXYGEN SATURATION: 9 % | SYSTOLIC BLOOD PRESSURE: 127 MMHG | RESPIRATION RATE: 15 BRPM

## 2025-02-18 DIAGNOSIS — Z85.6 HISTORY OF ACUTE LYMPHOBLASTIC LEUKEMIA (ALL): ICD-10-CM

## 2025-02-18 DIAGNOSIS — R79.0 ABNORMAL RESULT OF IRON PROFILE TESTING: Primary | ICD-10-CM

## 2025-02-18 RX ORDER — ONDANSETRON 8 MG/1
8 TABLET, ORALLY DISINTEGRATING ORAL EVERY 8 HOURS PRN
COMMUNITY
Start: 2025-02-11

## 2025-02-18 NOTE — PROGRESS NOTES
Subjective:       Patient ID: Dipak Boyd is a 34 y.o. male.    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  Workup revealed an acute biphenotypic leukemia with complex cytogenetics.  He underwent induction treatment at [facility] with rituximab, Fludarabine and Sirena-C + Decadron.  He received a bone marrow transplant from a matched unrelated donor 9/14/10 following a conditioning regimen of Fludarabine and Busulfan.  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  Bone marrow exam 1/23/12 showed a 40-50% cellular marrow with trilineage maturation and no evidence of recurrent leukemia.  He was last seen for follow-up in my office 3/27/14 and did not return for additional appointments.    Wellness testing by his PCP 2/24 showed elevated LFTs.  Additional workup showed an elevated serum ferritin level >1000 ng/mL.  Serum iron was 90, TIBC 232 and saturation 39%.  His LFTs improved on follow-up testing.  Testing for hepatitis-B and C was negative.  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  He has also been noted to have minimal anemia and mild thrombocytopenia.    Follow-up testing showed a serum ferritin level of 912 ng/mL and an iron saturation of 52%, which was at the upper limit of normal.  We discussed the fact that his genotype testing could have also been affected by his previous bone marrow transplant.  He had no evidence of end-organ effects secondary to his elevated ferritin level  or clinical evidence of hemochromatosis.  An initial period of observation was recommended.    Interval History  Dipak returns today by himself for six month hematology follow-up visit.  He re-established care 5/6/2024 for an elevated ferritin level and heterozygous H63D HFE mutation, which is not associated with clinically significant hemochromatosis. An initial period of observation was recommended.  He feels well today.  He had a GI virus last week, and flu and Covid around the holidays.  He recovered uneventfully as is back to his usual state of health.  Laboratory drawn 2/12/25 showed stable mild anemia and thrombocytopenia.  Iron profile showed an elevated iron saturation of 55% (previously 40) and a ferritin level of 853 (previously 916).  He remains entirely asymptomatic.    Review of Systems   Constitutional:  Negative for appetite change, fatigue and fever.   HENT:  Negative for mouth sores, nosebleeds, sore throat and trouble swallowing.    Eyes: Negative.    Respiratory:  Negative for cough and shortness of breath.    Cardiovascular:  Negative for chest pain, palpitations and leg swelling.   Gastrointestinal:  Negative for abdominal distention, abdominal pain, constipation, diarrhea, nausea and vomiting.   Genitourinary:  Negative for dysuria, frequency and urgency.   Musculoskeletal:  Negative for arthralgias and back pain.   Integumentary:  Negative for pallor and rash.   Neurological:  Negative for dizziness, weakness, numbness and headaches.   Hematological:  Negative for adenopathy. Does not bruise/bleed easily.   Psychiatric/Behavioral: Negative.         PMHx:  SVT s/p ablation 2004, chronic anxiety, AML, shingles  PSHx:  Varicocele repair, strabismus surgery, bone marrow  SH:  Lifetime nonsmoker, occasional social alcohol.  Lives in [place] with his wife and 2 children, works for [employer].  FH:  PGF had leukemia and a maternal uncle had colon cancer.    Objective:        /84  "(Patient Position: Sitting)   Pulse 88   Temp 98.6 °F (37 °C)   Resp 15   Ht 6' 4" (1.93 m)   Wt 99.8 kg (220 lb)   SpO2 (!) 9%   BMI 26.78 kg/m²    Physical Exam  Constitutional:       General: He is not in acute distress.     Appearance: Normal appearance.   HENT:      Head: Normocephalic.      Mouth/Throat:      Mouth: Mucous membranes are moist.      Pharynx: Oropharynx is clear. No posterior oropharyngeal erythema.   Eyes:      General: No scleral icterus.     Extraocular Movements: Extraocular movements intact.   Cardiovascular:      Rate and Rhythm: Normal rate and regular rhythm.   Pulmonary:      Effort: Pulmonary effort is normal.   Abdominal:      General: Abdomen is flat.      Palpations: Abdomen is soft.      Comments: No palpable masses or organomegaly   Musculoskeletal:         General: No swelling or tenderness. Normal range of motion.      Cervical back: Normal range of motion and neck supple.   Lymphadenopathy:      Comments: No palpable peripheral lymphadenopathy   Skin:     General: Skin is warm and dry.      Findings: No rash.   Neurological:      General: No focal deficit present.      Mental Status: He is alert and oriented to person, place, and time.      Cranial Nerves: No cranial nerve deficit.      Motor: No weakness.       ECOG SCORE    0 - Fully active-able to carry on all pre-disease performance without restriction          LABORATORY  Recent Results (from the past week)   Ferritin    Collection Time: 02/12/25 12:21 PM   Result Value Ref Range    Ferritin Level 853.90 (H) 21.81 - 274.66 ng/mL   Iron and TIBC    Collection Time: 02/12/25 12:21 PM   Result Value Ref Range    Iron Binding Capacity Unsaturated 98 60 - 240 ug/dL    Iron Level 118 65 - 175 ug/dL    Transferrin 181 174 - 364 mg/dL    Iron Binding Capacity Total 216 (L) 250 - 450 ug/dL    Iron Saturation 55 (H) 20 - 50 %   Comprehensive Metabolic Panel    Collection Time: 02/12/25 12:21 PM   Result Value Ref Range    " Sodium 138 136 - 145 mmol/L    Potassium 4.5 3.5 - 5.1 mmol/L    Chloride 107 98 - 107 mmol/L    CO2 25 22 - 29 mmol/L    Glucose 94 74 - 100 mg/dL    Blood Urea Nitrogen 17.1 8.9 - 20.6 mg/dL    Creatinine 0.92 0.72 - 1.25 mg/dL    Calcium 9.8 8.4 - 10.2 mg/dL    Protein Total 6.9 6.4 - 8.3 gm/dL    Albumin 4.3 3.5 - 5.0 g/dL    Globulin 2.6 2.4 - 3.5 gm/dL    Albumin/Globulin Ratio 1.7 1.1 - 2.0 ratio    Bilirubin Total 0.5 <=1.5 mg/dL    ALP 53 40 - 150 unit/L    ALT 50 0 - 55 unit/L    AST 44 (H) 5 - 34 unit/L    eGFR >60 mL/min/1.73/m2    Anion Gap 6.0 mEq/L    BUN/Creatinine Ratio 19    Lipid Panel    Collection Time: 02/12/25 12:21 PM   Result Value Ref Range    Cholesterol Total 250 (H) <=200 mg/dL    HDL Cholesterol 67 (H) 35 - 60 mg/dL    Triglyceride 82 34 - 140 mg/dL    Cholesterol/HDL Ratio 4 0 - 5    Very Low Density Lipoprotein 16     LDL Cholesterol 167.00 (H) 50.00 - 140.00 mg/dL   TSH    Collection Time: 02/12/25 12:21 PM   Result Value Ref Range    TSH 1.489 0.350 - 4.940 uIU/mL   Vitamin D    Collection Time: 02/12/25 12:21 PM   Result Value Ref Range    Vitamin D 44 30 - 80 ng/mL   CBC with Differential    Collection Time: 02/12/25 12:21 PM   Result Value Ref Range    WBC 5.19 4.50 - 11.50 x10(3)/mcL    RBC 4.11 (L) 4.70 - 6.10 x10(6)/mcL    Hgb 13.4 (L) 14.0 - 18.0 g/dL    Hct 39.8 (L) 42.0 - 52.0 %    MCV 96.8 (H) 80.0 - 94.0 fL    MCH 32.6 (H) 27.0 - 31.0 pg    MCHC 33.7 33.0 - 36.0 g/dL    RDW 12.7 11.5 - 17.0 %    Platelet 131 130 - 400 x10(3)/mcL    MPV 9.6 7.4 - 10.4 fL    IPF 2.6 0.9 - 11.2 %    Neut % 57.7 %    Lymph % 34.5 %    Mono % 6.4 %    Eos % 0.8 %    Basophil % 0.4 %    Imm Grans % 0.2 %    Neut # 3.00 2.1 - 9.2 x10(3)/mcL    Lymph # 1.79 0.6 - 4.6 x10(3)/mcL    Mono # 0.33 0.1 - 1.3 x10(3)/mcL    Eos # 0.04 0 - 0.9 x10(3)/mcL    Baso # 0.02 <=0.2 x10(3)/mcL    Imm Gran # 0.01 0.00 - 0.04 x10(3)/mcL    NRBC% 0.0 %            Assessment:   Elevated serum ferritin level -  stable  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 Lang.  Ferritin level remains mildly elevated and iron saturation shows interval increase.  Patient will be referred for one unit therapeutic phlebotomy at his convenience.  Repeat CBC and iron profile/ferritin 3 months post phlebotomy.  Patient is in agreement.  All questions answered to the satisfaction of the patient.    LITA FULLER, FNP-C  [facility] [place]       Other Physicians  Dr. Corey Littlejohn

## 2025-02-20 LAB — MAYO GENERIC ORDERABLE RESULT: NORMAL

## 2025-05-13 ENCOUNTER — PATIENT MESSAGE (OUTPATIENT)
Dept: HEMATOLOGY/ONCOLOGY | Facility: CLINIC | Age: 35
End: 2025-05-13
Payer: COMMERCIAL

## 2025-06-13 ENCOUNTER — LAB VISIT (OUTPATIENT)
Dept: LAB | Facility: HOSPITAL | Age: 35
End: 2025-06-13
Attending: INTERNAL MEDICINE
Payer: COMMERCIAL

## 2025-06-13 DIAGNOSIS — R79.0 ABNORMAL RESULT OF IRON PROFILE TESTING: ICD-10-CM

## 2025-06-13 LAB
BASOPHILS # BLD AUTO: 0.01 X10(3)/MCL
BASOPHILS NFR BLD AUTO: 0.2 %
EOSINOPHIL # BLD AUTO: 0.03 X10(3)/MCL (ref 0–0.9)
EOSINOPHIL NFR BLD AUTO: 0.5 %
ERYTHROCYTE [DISTWIDTH] IN BLOOD BY AUTOMATED COUNT: 12.6 % (ref 11.5–17)
FERRITIN SERPL-MCNC: 974.82 NG/ML (ref 21.81–274.66)
HCT VFR BLD AUTO: 39.7 % (ref 42–52)
HGB BLD-MCNC: 13.1 G/DL (ref 14–18)
IMM GRANULOCYTES # BLD AUTO: 0.01 X10(3)/MCL (ref 0–0.04)
IMM GRANULOCYTES NFR BLD AUTO: 0.2 %
IRON SATN MFR SERPL: 75 % (ref 20–50)
IRON SERPL-MCNC: 194 UG/DL (ref 65–175)
LYMPHOCYTES # BLD AUTO: 1.83 X10(3)/MCL (ref 0.6–4.6)
LYMPHOCYTES NFR BLD AUTO: 32.3 %
MCH RBC QN AUTO: 33 PG (ref 27–31)
MCHC RBC AUTO-ENTMCNC: 33 G/DL (ref 33–36)
MCV RBC AUTO: 100 FL (ref 80–94)
MONOCYTES # BLD AUTO: 0.4 X10(3)/MCL (ref 0.1–1.3)
MONOCYTES NFR BLD AUTO: 7.1 %
NEUTROPHILS # BLD AUTO: 3.38 X10(3)/MCL (ref 2.1–9.2)
NEUTROPHILS NFR BLD AUTO: 59.7 %
PLATELET # BLD AUTO: 126 X10(3)/MCL (ref 130–400)
PMV BLD AUTO: 9.2 FL (ref 7.4–10.4)
RBC # BLD AUTO: 3.97 X10(6)/MCL (ref 4.7–6.1)
TIBC SERPL-MCNC: 258 UG/DL (ref 250–450)
TIBC SERPL-MCNC: 64 UG/DL (ref 60–240)
TRANSFERRIN SERPL-MCNC: 208 MG/DL (ref 174–364)
WBC # BLD AUTO: 5.66 X10(3)/MCL (ref 4.5–11.5)

## 2025-06-13 PROCEDURE — 83540 ASSAY OF IRON: CPT

## 2025-06-13 PROCEDURE — 36415 COLL VENOUS BLD VENIPUNCTURE: CPT

## 2025-06-13 PROCEDURE — 82728 ASSAY OF FERRITIN: CPT

## 2025-06-13 PROCEDURE — 85025 COMPLETE CBC W/AUTO DIFF WBC: CPT

## 2025-06-18 NOTE — PROGRESS NOTES
"Subjective:       Patient ID: Dipak Boyd is a 34 y.o. male.    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  Workup revealed an acute biphenotypic leukemia with complex cytogenetics.  He underwent induction treatment at [facility] with rituximab, Fludarabine and Sirena-C + Decadron.  He received a bone marrow transplant from a matched unrelated donor 9/14/10 following a conditioning regimen of Fludarabine and Busulfan.  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  Bone marrow exam 1/23/12 showed a 40-50% cellular marrow with trilineage maturation and no evidence of recurrent leukemia.  He was last seen for follow-up in my office 3/27/14 and did not return for additional appointments.    Wellness testing by his PCP 2/24 showed elevated LFTs.  Additional workup showed an elevated serum ferritin level >1000 ng/mL.  Serum iron was 90, TIBC 232 and saturation 39%.  His LFTs improved on follow-up testing.  Testing for hepatitis-B and C was negative.  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  He has also been noted to have minimal anemia and mild thrombocytopenia.    Follow-up testing showed a serum ferritin level of 912 ng/mL and an iron saturation of 52%, which was at the upper limit of normal.  We discussed the fact that his genotype testing could have also been affected by his previous bone marrow transplant.  He had no evidence of end-organ effects secondary to his elevated " "ferritin level or clinical evidence of hemochromatosis.  An initial period of observation was recommended.    Interval History  Dipak returns today by himself for a three month follow-up visit.  He underwent therapeutic phlebotomy 6/2/25.  He was fatigued for several days following phlebotomy.  Laboratory 6/13/25 shows stable mild anemia. Iron saturation, serum iron and ferritin  level show mild interval increase.  Results discussed today with the patient.  He has no associated symptoms, but remains very concerned about the elevated levels and any potential long term complications.    Review of Systems   Constitutional:  Negative for appetite change, fatigue and fever.   HENT:  Negative for mouth sores, nosebleeds, sore throat and trouble swallowing.    Eyes: Negative.    Respiratory:  Negative for cough and shortness of breath.    Cardiovascular:  Negative for chest pain, palpitations and leg swelling.   Gastrointestinal:  Negative for abdominal distention, abdominal pain, constipation, diarrhea, nausea and vomiting.   Genitourinary:  Negative for dysuria, frequency and urgency.   Musculoskeletal:  Negative for arthralgias and back pain.   Integumentary:  Negative for pallor and rash.   Neurological:  Negative for dizziness, weakness, numbness and headaches.   Hematological:  Negative for adenopathy. Does not bruise/bleed easily.   Psychiatric/Behavioral: Negative.         PMHx:  SVT s/p ablation 2004, chronic anxiety, AML, shingles  PSHx:  Varicocele repair, strabismus surgery, bone marrow  SH:  Lifetime nonsmoker, occasional social alcohol.  Lives in [place] with his wife and 2 children, works for [employer].  FH:  PGF had leukemia and a maternal uncle had colon cancer.    Objective:        /81   Pulse 85   Temp 98.5 °F (36.9 °C)   Resp 15   Ht 6' 3" (1.905 m)   Wt 101.3 kg (223 lb 4.8 oz)   SpO2 98%   BMI 27.91 kg/m²    Physical Exam  Constitutional:       General: He is not in acute " distress.     Appearance: Normal appearance.   HENT:      Head: Normocephalic.      Mouth/Throat:      Mouth: Mucous membranes are moist.      Pharynx: Oropharynx is clear. No posterior oropharyngeal erythema.   Eyes:      General: No scleral icterus.     Extraocular Movements: Extraocular movements intact.   Cardiovascular:      Rate and Rhythm: Normal rate and regular rhythm.   Pulmonary:      Effort: Pulmonary effort is normal.   Abdominal:      General: Abdomen is flat.      Palpations: Abdomen is soft.      Comments: No palpable masses or organomegaly   Musculoskeletal:         General: No swelling or tenderness. Normal range of motion.      Cervical back: Normal range of motion and neck supple.   Lymphadenopathy:      Comments: No palpable peripheral lymphadenopathy   Skin:     General: Skin is warm and dry.      Findings: No rash.   Neurological:      General: No focal deficit present.      Mental Status: He is alert and oriented to person, place, and time.      Cranial Nerves: No cranial nerve deficit.      Motor: No weakness.       ECOG SCORE    0 - Fully active-able to carry on all pre-disease performance without restriction          LABORATORY  Recent Results (from the past week)   Ferritin    Collection Time: 06/13/25 11:58 AM   Result Value Ref Range    Ferritin Level 974.82 (H) 21.81 - 274.66 ng/mL   Iron and TIBC    Collection Time: 06/13/25 11:58 AM   Result Value Ref Range    Iron Binding Capacity Unsaturated 64 60 - 240 ug/dL    Iron Level 194 (H) 65 - 175 ug/dL    Transferrin 208 174 - 364 mg/dL    Iron Binding Capacity Total 258 250 - 450 ug/dL    Iron Saturation 75 (H) 20 - 50 %   CBC with Differential    Collection Time: 06/13/25 11:58 AM   Result Value Ref Range    WBC 5.66 4.50 - 11.50 x10(3)/mcL    RBC 3.97 (L) 4.70 - 6.10 x10(6)/mcL    Hgb 13.1 (L) 14.0 - 18.0 g/dL    Hct 39.7 (L) 42.0 - 52.0 %    .0 (H) 80.0 - 94.0 fL    MCH 33.0 (H) 27.0 - 31.0 pg    MCHC 33.0 33.0 - 36.0 g/dL     RDW 12.6 11.5 - 17.0 %    Platelet 126 (L) 130 - 400 x10(3)/mcL    MPV 9.2 7.4 - 10.4 fL    Neut % 59.7 %    Lymph % 32.3 %    Mono % 7.1 %    Eos % 0.5 %    Basophil % 0.2 %    Imm Grans % 0.2 %    Neut # 3.38 2.1 - 9.2 x10(3)/mcL    Lymph # 1.83 0.6 - 4.6 x10(3)/mcL    Mono # 0.40 0.1 - 1.3 x10(3)/mcL    Eos # 0.03 0 - 0.9 x10(3)/mcL    Baso # 0.01 <=0.2 x10(3)/mcL    Imm Gran # 0.01 0.00 - 0.04 x10(3)/mcL            Assessment:   Elevated serum ferritin level   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  Laboratory shows mild interval increase in ferritin and iron studies, likely due to proximity of the lab draw in relation to the phlebotomy.  Case discussed with physician.  Recommends short term follow-up with CBC and iron studies/ferritin in 4-6 weeks.  Refer to GI to discuss any liver imaging studies.  All questions answered to the satisfaction of the patient.    LITA FULLER, FNP-C  Cancer Center [facility] at [facility] [place]       Other Physicians  Dr. Corey Littlejohn

## 2025-06-19 ENCOUNTER — OFFICE VISIT (OUTPATIENT)
Dept: HEMATOLOGY/ONCOLOGY | Facility: CLINIC | Age: 35
End: 2025-06-19
Payer: COMMERCIAL

## 2025-06-19 VITALS
DIASTOLIC BLOOD PRESSURE: 81 MMHG | SYSTOLIC BLOOD PRESSURE: 125 MMHG | HEIGHT: 75 IN | RESPIRATION RATE: 15 BRPM | WEIGHT: 223.31 LBS | BODY MASS INDEX: 27.77 KG/M2 | HEART RATE: 85 BPM | OXYGEN SATURATION: 98 % | TEMPERATURE: 99 F

## 2025-06-19 DIAGNOSIS — R79.0 ABNORMAL RESULT OF IRON PROFILE TESTING: Primary | ICD-10-CM

## 2025-06-19 DIAGNOSIS — Z85.6 HISTORY OF ACUTE LYMPHOBLASTIC LEUKEMIA (ALL): ICD-10-CM

## 2025-06-19 PROCEDURE — 3079F DIAST BP 80-89 MM HG: CPT | Mod: CPTII,S$GLB,, | Performed by: NURSE PRACTITIONER

## 2025-06-19 PROCEDURE — 1159F MED LIST DOCD IN RCRD: CPT | Mod: CPTII,S$GLB,, | Performed by: NURSE PRACTITIONER

## 2025-06-19 PROCEDURE — 3074F SYST BP LT 130 MM HG: CPT | Mod: CPTII,S$GLB,, | Performed by: NURSE PRACTITIONER

## 2025-06-19 PROCEDURE — 3008F BODY MASS INDEX DOCD: CPT | Mod: CPTII,S$GLB,, | Performed by: NURSE PRACTITIONER

## 2025-06-19 PROCEDURE — 1160F RVW MEDS BY RX/DR IN RCRD: CPT | Mod: CPTII,S$GLB,, | Performed by: NURSE PRACTITIONER

## 2025-06-19 PROCEDURE — 99214 OFFICE O/P EST MOD 30 MIN: CPT | Mod: S$GLB,,, | Performed by: NURSE PRACTITIONER

## 2025-06-19 PROCEDURE — 99999 PR PBB SHADOW E&M-EST. PATIENT-LVL IV: CPT | Mod: PBBFAC,,, | Performed by: NURSE PRACTITIONER

## 2025-07-15 ENCOUNTER — LAB VISIT (OUTPATIENT)
Dept: LAB | Facility: HOSPITAL | Age: 35
End: 2025-07-15
Attending: INTERNAL MEDICINE
Payer: COMMERCIAL

## 2025-07-15 DIAGNOSIS — R79.0 ABNORMAL RESULT OF IRON PROFILE TESTING: ICD-10-CM

## 2025-07-15 LAB
ALBUMIN SERPL-MCNC: 4.6 G/DL (ref 3.5–5)
ALBUMIN/GLOB SERPL: 1.5 RATIO (ref 1.1–2)
ALP SERPL-CCNC: 55 UNIT/L (ref 40–150)
ALT SERPL-CCNC: 23 UNIT/L (ref 0–55)
ANION GAP SERPL CALC-SCNC: 11 MEQ/L
AST SERPL-CCNC: 17 UNIT/L (ref 11–45)
BASOPHILS # BLD AUTO: 0.01 X10(3)/MCL
BASOPHILS NFR BLD AUTO: 0.2 %
BILIRUB SERPL-MCNC: 0.5 MG/DL
BUN SERPL-MCNC: 17.2 MG/DL (ref 8.9–20.6)
CALCIUM SERPL-MCNC: 9.8 MG/DL (ref 8.4–10.2)
CHLORIDE SERPL-SCNC: 104 MMOL/L (ref 98–107)
CO2 SERPL-SCNC: 25 MMOL/L (ref 22–29)
CREAT SERPL-MCNC: 1.21 MG/DL (ref 0.72–1.25)
CREAT/UREA NIT SERPL: 14
EOSINOPHIL # BLD AUTO: 0.02 X10(3)/MCL (ref 0–0.9)
EOSINOPHIL NFR BLD AUTO: 0.3 %
ERYTHROCYTE [DISTWIDTH] IN BLOOD BY AUTOMATED COUNT: 11.8 % (ref 11.5–17)
FERRITIN SERPL-MCNC: 945.01 NG/ML (ref 21.81–274.66)
GFR SERPLBLD CREATININE-BSD FMLA CKD-EPI: >60 ML/MIN/1.73/M2
GLOBULIN SER-MCNC: 3 GM/DL (ref 2.4–3.5)
GLUCOSE SERPL-MCNC: 94 MG/DL (ref 74–100)
HCT VFR BLD AUTO: 41.7 % (ref 42–52)
HGB BLD-MCNC: 13.9 G/DL (ref 14–18)
IMM GRANULOCYTES # BLD AUTO: 0.01 X10(3)/MCL (ref 0–0.04)
IMM GRANULOCYTES NFR BLD AUTO: 0.2 %
IRON SATN MFR SERPL: 46 % (ref 20–50)
IRON SERPL-MCNC: 110 UG/DL (ref 65–175)
LYMPHOCYTES # BLD AUTO: 1.21 X10(3)/MCL (ref 0.6–4.6)
LYMPHOCYTES NFR BLD AUTO: 21 %
MCH RBC QN AUTO: 33.4 PG (ref 27–31)
MCHC RBC AUTO-ENTMCNC: 33.3 G/DL (ref 33–36)
MCV RBC AUTO: 100.2 FL (ref 80–94)
MONOCYTES # BLD AUTO: 0.36 X10(3)/MCL (ref 0.1–1.3)
MONOCYTES NFR BLD AUTO: 6.2 %
NEUTROPHILS # BLD AUTO: 4.16 X10(3)/MCL (ref 2.1–9.2)
NEUTROPHILS NFR BLD AUTO: 72.1 %
PLATELET # BLD AUTO: 130 X10(3)/MCL (ref 130–400)
PMV BLD AUTO: 9.1 FL (ref 7.4–10.4)
POTASSIUM SERPL-SCNC: 4 MMOL/L (ref 3.5–5.1)
PROT SERPL-MCNC: 7.6 GM/DL (ref 6.4–8.3)
RBC # BLD AUTO: 4.16 X10(6)/MCL (ref 4.7–6.1)
SODIUM SERPL-SCNC: 140 MMOL/L (ref 136–145)
TIBC SERPL-MCNC: 131 UG/DL (ref 60–240)
TIBC SERPL-MCNC: 241 UG/DL (ref 250–450)
TRANSFERRIN SERPL-MCNC: 201 MG/DL (ref 174–364)
WBC # BLD AUTO: 5.77 X10(3)/MCL (ref 4.5–11.5)

## 2025-07-15 PROCEDURE — 83540 ASSAY OF IRON: CPT

## 2025-07-15 PROCEDURE — 82728 ASSAY OF FERRITIN: CPT

## 2025-07-15 PROCEDURE — 36415 COLL VENOUS BLD VENIPUNCTURE: CPT

## 2025-07-15 PROCEDURE — 85025 COMPLETE CBC W/AUTO DIFF WBC: CPT

## 2025-07-15 PROCEDURE — 80053 COMPREHEN METABOLIC PANEL: CPT

## 2025-07-25 ENCOUNTER — OFFICE VISIT (OUTPATIENT)
Dept: HEMATOLOGY/ONCOLOGY | Facility: CLINIC | Age: 35
End: 2025-07-25
Payer: COMMERCIAL

## 2025-07-25 VITALS
HEART RATE: 59 BPM | HEIGHT: 76 IN | TEMPERATURE: 98 F | RESPIRATION RATE: 15 BRPM | SYSTOLIC BLOOD PRESSURE: 134 MMHG | DIASTOLIC BLOOD PRESSURE: 93 MMHG | WEIGHT: 223.69 LBS | OXYGEN SATURATION: 98 % | BODY MASS INDEX: 27.24 KG/M2

## 2025-07-25 DIAGNOSIS — R79.0 ABNORMAL RESULT OF IRON PROFILE TESTING: ICD-10-CM

## 2025-07-25 DIAGNOSIS — R79.89 ELEVATED FERRITIN: Primary | ICD-10-CM

## 2025-07-25 PROCEDURE — 99999 PR PBB SHADOW E&M-EST. PATIENT-LVL III: CPT | Mod: PBBFAC,,, | Performed by: NURSE PRACTITIONER

## 2025-07-25 NOTE — PROGRESS NOTES
"Subjective:       Patient ID: Dipak Boyd is a 34 y.o. male.    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  Workup revealed an acute biphenotypic leukemia with complex cytogenetics.  He underwent induction treatment at [facility] with rituximab, Fludarabine and Sirena-C + Decadron.  He received a bone marrow transplant from a matched unrelated donor 9/14/10 following a conditioning regimen of Fludarabine and Busulfan.  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  Bone marrow exam 1/23/12 showed a 40-50% cellular marrow with trilineage maturation and no evidence of recurrent leukemia.  He was last seen for follow-up in my office 3/27/14 and did not return for additional appointments.    Wellness testing by his PCP 2/24 showed elevated LFTs.  Additional workup showed an elevated serum ferritin level >1000 ng/mL.  Serum iron was 90, TIBC 232 and saturation 39%.  His LFTs improved on follow-up testing.  Testing for hepatitis-B and C was negative.  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  He has also been noted to have minimal anemia and mild thrombocytopenia.    Follow-up testing showed a serum ferritin level of 912 ng/mL and an iron saturation of 52%, which was at the upper limit of normal.  We discussed the fact that his genotype testing could have also been affected by his previous bone marrow transplant.  He had no evidence of " end-organ effects secondary to his elevated ferritin level or clinical evidence of hemochromatosis.  An initial period of observation was recommended.    Interval History  Dipak returns today by himself for a six week hematology follow-up visit.  He underwent therapeutic phlebotomy 6/2/25 for an increasing ferritin and abnormal iron profile studies.  He was fatigued for several days following phlebotomy.  Due to unchanged laboratory parameters following phlebotomy, short term follow-up was recommended and he is here today for that.  He is now 8 weeks post phlebotomy.  Ferritin level is stable, but serum iron and iron saturation show interval decrease.  Patient remains concerned about the long term complications of his abnormal laboratory studies.  He has been referred to GI to discuss imaging of the liver.    Review of Systems   Constitutional:  Negative for appetite change, fatigue and fever.   HENT:  Negative for mouth sores, nosebleeds, sore throat and trouble swallowing.    Eyes: Negative.    Respiratory:  Negative for cough and shortness of breath.    Cardiovascular:  Negative for chest pain, palpitations and leg swelling.   Gastrointestinal:  Negative for abdominal distention, abdominal pain, constipation, diarrhea, nausea and vomiting.   Genitourinary:  Negative for dysuria, frequency and urgency.   Musculoskeletal:  Negative for arthralgias and back pain.   Integumentary:  Negative for pallor and rash.   Neurological:  Negative for dizziness, weakness, numbness and headaches.   Hematological:  Negative for adenopathy. Does not bruise/bleed easily.   Psychiatric/Behavioral: Negative.         PMHx:  SVT s/p ablation 2004, chronic anxiety, AML, shingles  PSHx:  Varicocele repair, strabismus surgery, bone marrow  SH:  Lifetime nonsmoker, occasional social alcohol.  Lives in [place] with his wife and 2 children, works for [employer].  FH:  PGF had leukemia and a maternal uncle had colon  "cancer.    Objective:        BP (!) 134/93   Pulse (!) 59   Temp 98 °F (36.7 °C)   Resp 15   Ht 6' 4" (1.93 m)   Wt 101.5 kg (223 lb 11.2 oz)   SpO2 98%   BMI 27.23 kg/m²    Physical Exam  Constitutional:       General: He is not in acute distress.     Appearance: Normal appearance.   HENT:      Head: Normocephalic.      Mouth/Throat:      Mouth: Mucous membranes are moist.      Pharynx: Oropharynx is clear. No posterior oropharyngeal erythema.   Eyes:      General: No scleral icterus.     Extraocular Movements: Extraocular movements intact.   Cardiovascular:      Rate and Rhythm: Normal rate and regular rhythm.   Pulmonary:      Effort: Pulmonary effort is normal.   Abdominal:      General: Abdomen is flat.      Palpations: Abdomen is soft.      Comments: No palpable masses or organomegaly   Musculoskeletal:         General: No swelling or tenderness. Normal range of motion.      Cervical back: Normal range of motion and neck supple.   Lymphadenopathy:      Comments: No palpable peripheral lymphadenopathy   Skin:     General: Skin is warm and dry.      Findings: No rash.   Neurological:      General: No focal deficit present.      Mental Status: He is alert and oriented to person, place, and time.      Cranial Nerves: No cranial nerve deficit.      Motor: No weakness.       ECOG SCORE    0 - Fully active-able to carry on all pre-disease performance without restriction          LABORATORY   Latest Reference Range & Units 07/15/25 12:21   WBC 4.50 - 11.50 x10(3)/mcL 5.77   RBC 4.70 - 6.10 x10(6)/mcL 4.16 (L)   Hemoglobin 14.0 - 18.0 g/dL 13.9 (L)   Hematocrit 42.0 - 52.0 % 41.7 (L)   MCV 80.0 - 94.0 fL 100.2 (H)   MCH 27.0 - 31.0 pg 33.4 (H)   MCHC 33.0 - 36.0 g/dL 33.3   RDW 11.5 - 17.0 % 11.8   Platelet Count 130 - 400 x10(3)/mcL 130   MPV 7.4 - 10.4 fL 9.1   Neut % % 72.1   LYMPH % % 21.0   Mono % % 6.2   Eos % % 0.3   Basophil % % 0.2   Immature Granulocytes % 0.2   Neut # 2.1 - 9.2 x10(3)/mcL 4.16   Lymph " # 0.6 - 4.6 x10(3)/mcL 1.21   Mono # 0.1 - 1.3 x10(3)/mcL 0.36   Eos # 0 - 0.9 x10(3)/mcL 0.02   Baso # <=0.2 x10(3)/mcL 0.01   Immature Grans (Abs) 0.00 - 0.04 x10(3)/mcL 0.01   Iron 65 - 175 ug/dL 110   TIBC 250 - 450 ug/dL 241 (L)   UIBC 60 - 240 ug/dL 131   Transferrin 174 - 364 mg/dL 201   Ferritin 21.81 - 274.66 ng/mL 945.01 (H)   Iron Saturation 20 - 50 % 46   Sodium 136 - 145 mmol/L 140   Potassium 3.5 - 5.1 mmol/L 4.0   Chloride 98 - 107 mmol/L 104   CO2 22 - 29 mmol/L 25   Anion Gap mEq/L 11.0   BUN 8.9 - 20.6 mg/dL 17.2   Creatinine 0.72 - 1.25 mg/dL 1.21   BUN/CREAT RATIO  14   eGFR mL/min/1.73/m2 >60   Glucose 74 - 100 mg/dL 94   Calcium 8.4 - 10.2 mg/dL 9.8   ALP 40 - 150 unit/L 55   PROTEIN TOTAL 6.4 - 8.3 gm/dL 7.6   Albumin 3.5 - 5.0 g/dL 4.6   Albumin/Globulin Ratio 1.1 - 2.0 ratio 1.5   BILIRUBIN TOTAL <=1.5 mg/dL 0.5   AST 11 - 45 unit/L 17   ALT 0 - 55 unit/L 23   Globulin, Total 2.4 - 3.5 gm/dL 3.0   (L): Data is abnormally low  (H): Data is abnormally high        Assessment:   Elevated serum ferritin level   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  Case discussed with physician.  Recommends a conservative phlebotomy schedule, initially every 8 weeks as tolerated.  Hold phlebotomy for hemoglobin <11.  Follow-up in 4 months with CBC, iron profile and ferritin level.  GI evaluation as ordered.    All questions answered to the satisfaction of the patient.    LITA FULLER, FNP-C  Cancer Center [facility]       Other Physicians  Dr. Corey Littlejohn

## 2025-08-05 ENCOUNTER — INFUSION (OUTPATIENT)
Dept: INFUSION THERAPY | Facility: HOSPITAL | Age: 35
End: 2025-08-05
Attending: INTERNAL MEDICINE
Payer: COMMERCIAL

## 2025-08-05 VITALS
DIASTOLIC BLOOD PRESSURE: 65 MMHG | OXYGEN SATURATION: 98 % | TEMPERATURE: 98 F | SYSTOLIC BLOOD PRESSURE: 114 MMHG | HEART RATE: 105 BPM | RESPIRATION RATE: 16 BRPM

## 2025-08-05 DIAGNOSIS — R79.89 ELEVATED FERRITIN: ICD-10-CM

## 2025-08-05 DIAGNOSIS — R79.0 ABNORMAL RESULT OF IRON PROFILE TESTING: Primary | ICD-10-CM

## 2025-08-05 PROCEDURE — 99195 PHLEBOTOMY: CPT

## 2025-08-05 NOTE — PLAN OF CARE
Therapeutic Phlebotomy performed; 500 mL of blood removed; patient tolerated well; orthostatic VS performed and WNL; next appointments confirmed with patient; discharged home in stable condition.

## 2025-08-12 DIAGNOSIS — R79.0 ABNORMAL RESULT OF IRON PROFILE TESTING: Primary | ICD-10-CM

## 2025-08-12 DIAGNOSIS — R79.89 ELEVATED FERRITIN LEVEL: Primary | ICD-10-CM

## 2025-08-19 ENCOUNTER — HOSPITAL ENCOUNTER (OUTPATIENT)
Dept: RADIOLOGY | Facility: HOSPITAL | Age: 35
Discharge: HOME | End: 2025-08-19
Attending: NURSE PRACTITIONER
Payer: COMMERCIAL

## 2025-08-19 DIAGNOSIS — R79.89 ELEVATED FERRITIN LEVEL: ICD-10-CM

## 2025-08-19 PROCEDURE — A9577 INJ MULTIHANCE: HCPCS | Performed by: NURSE PRACTITIONER

## 2025-08-19 PROCEDURE — 25500020 PHARM REV CODE 255: Performed by: NURSE PRACTITIONER

## 2025-08-19 PROCEDURE — 74183 MRI ABD W/O CNTR FLWD CNTR: CPT | Mod: TC

## 2025-08-19 RX ADMIN — GADOBENATE DIMEGLUMINE 20 ML: 529 INJECTION, SOLUTION INTRAVENOUS at 12:08

## 2025-08-22 ENCOUNTER — PATIENT MESSAGE (OUTPATIENT)
Dept: HEMATOLOGY/ONCOLOGY | Facility: CLINIC | Age: 35
End: 2025-08-22
Payer: COMMERCIAL